# Patient Record
Sex: FEMALE
[De-identification: names, ages, dates, MRNs, and addresses within clinical notes are randomized per-mention and may not be internally consistent; named-entity substitution may affect disease eponyms.]

---

## 2018-05-22 PROBLEM — Z00.00 ENCOUNTER FOR PREVENTIVE HEALTH EXAMINATION: Status: ACTIVE | Noted: 2018-05-22

## 2018-08-10 ENCOUNTER — APPOINTMENT (OUTPATIENT)
Dept: NEUROSURGERY | Facility: CLINIC | Age: 53
End: 2018-08-10

## 2019-06-14 ENCOUNTER — APPOINTMENT (OUTPATIENT)
Dept: NEUROSURGERY | Facility: CLINIC | Age: 54
End: 2019-06-14
Payer: COMMERCIAL

## 2019-06-14 VITALS — BODY MASS INDEX: 24.11 KG/M2 | HEIGHT: 66 IN | WEIGHT: 150 LBS

## 2019-06-14 DIAGNOSIS — M54.12 RADICULOPATHY, CERVICAL REGION: ICD-10-CM

## 2019-06-14 DIAGNOSIS — M47.812 SPONDYLOSIS W/OUT MYELOPATHY OR RADICULOPATHY, CERVICAL REGION: ICD-10-CM

## 2019-06-14 PROCEDURE — 99214 OFFICE O/P EST MOD 30 MIN: CPT

## 2019-06-15 RX ORDER — GABAPENTIN 100 MG/1
100 CAPSULE ORAL
Qty: 120 | Refills: 1 | Status: ACTIVE | COMMUNITY
Start: 2019-06-15 | End: 1900-01-01

## 2019-06-15 RX ORDER — CELECOXIB 200 MG/1
200 CAPSULE ORAL TWICE DAILY
Qty: 60 | Refills: 1 | Status: ACTIVE | COMMUNITY
Start: 2019-06-15 | End: 1900-01-01

## 2019-06-18 PROBLEM — M47.812 CERVICAL SPONDYLOSIS: Status: ACTIVE | Noted: 2019-06-18

## 2019-06-18 PROBLEM — M54.12 CERVICAL RADICULOPATHY: Status: ACTIVE | Noted: 2019-06-18

## 2019-06-18 NOTE — DATA REVIEWED
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 This however was not reported by the radiologist.

## 2019-06-18 NOTE — ASSESSMENT
[FreeTextEntry1] : At this point I have recommended JOSH's with Dr. Castro, a pain specialist. We have discussed her condition findings and treatment options today. I have recommended she trial celebrex and gabapentin. I will see her for follow up in the future. She understood our discussion well.

## 2019-06-18 NOTE — HISTORY OF PRESENT ILLNESS
[FreeTextEntry1] : Ms. Carrasco was last seen a year ago. She recently developed an exacerbation of right sided cervical radiculopathy affected the right triceps in the C7 distribution. The pain is accompanied with paraesthesias affecting the hand. She notes right scapular pain that extends down the right arm. She has mild neck pain. A medrol dose pack has provided some relief. She has had no recent conservative treatments.

## 2019-06-18 NOTE — PHYSICAL EXAM
[FreeTextEntry1] : She has pain inhibited weakness in the right triceps. Sensory intact. Reflexes symmetric. No myelopathy. Neck is nontender.

## 2020-03-06 ENCOUNTER — APPOINTMENT (OUTPATIENT)
Dept: NEUROSURGERY | Facility: CLINIC | Age: 55
End: 2020-03-06
Payer: COMMERCIAL

## 2020-03-06 DIAGNOSIS — M54.2 CERVICALGIA: ICD-10-CM

## 2020-03-06 PROCEDURE — 99214 OFFICE O/P EST MOD 30 MIN: CPT

## 2020-03-06 NOTE — ASSESSMENT
[FreeTextEntry1] : Since I last saw her she no longer has experienced right neck pain with radiation to the right upper extremity for the last 6 months. over the last 2 months she has been experiencing periodic left sided neck pain with spread to the left suboccipital, occipital and all the way to the forehead area. No cervical radiculopathy to the left UE. \par \par On exam, motor and sensory intact bilaterally. Reflexes symmetric. No myelopathic signs. Gait steady. Sensory intact. ROM of the neck is good except for some tightness and pulling sensation in the left side of the neck. Tenderness to palpation on the left side of the upper neck.\par \par I gave her an Rx of Flexeril and she still has Celebrex at home which I recommended to take on a as needed basis. If her condition persists, I will have her undergo an MRI of the cervical spine.\par \par

## 2020-03-07 RX ORDER — CYCLOBENZAPRINE HYDROCHLORIDE 10 MG/1
10 TABLET, FILM COATED ORAL
Qty: 90 | Refills: 1 | Status: ACTIVE | COMMUNITY
Start: 2020-03-07 | End: 1900-01-01